# Patient Record
Sex: MALE | Race: BLACK OR AFRICAN AMERICAN | NOT HISPANIC OR LATINO | Employment: FULL TIME | URBAN - METROPOLITAN AREA
[De-identification: names, ages, dates, MRNs, and addresses within clinical notes are randomized per-mention and may not be internally consistent; named-entity substitution may affect disease eponyms.]

---

## 2017-11-29 ENCOUNTER — OFFICE VISIT - HEALTHEAST (OUTPATIENT)
Dept: INTERNAL MEDICINE | Facility: CLINIC | Age: 47
End: 2017-11-29

## 2017-11-29 DIAGNOSIS — B19.10 HEPATITIS B: ICD-10-CM

## 2017-11-29 DIAGNOSIS — Z00.00 HEALTH CARE MAINTENANCE: ICD-10-CM

## 2017-11-29 LAB — HIV 1+2 AB+HIV1 P24 AG SERPL QL IA: NEGATIVE

## 2017-11-29 ASSESSMENT — MIFFLIN-ST. JEOR: SCORE: 1909.06

## 2017-11-30 LAB
HBV SURFACE AG SERPL QL IA: NEGATIVE
HCV AB SERPL QL IA: NEGATIVE
HEPATITIS B SURFACE ANTIBODY LHE- HISTORICAL: POSITIVE

## 2017-12-01 LAB
HBV CORE AB SERPL QL IA: POSITIVE
HBV CORE IGM SERPL QL IA: NEGATIVE

## 2018-01-02 ENCOUNTER — OFFICE VISIT - HEALTHEAST (OUTPATIENT)
Dept: FAMILY MEDICINE | Facility: CLINIC | Age: 48
End: 2018-01-02

## 2018-01-02 DIAGNOSIS — J06.9 VIRAL UPPER RESPIRATORY ILLNESS: ICD-10-CM

## 2018-01-02 DIAGNOSIS — R42 DIZZINESS: ICD-10-CM

## 2020-01-14 ENCOUNTER — OFFICE VISIT - HEALTHEAST (OUTPATIENT)
Dept: FAMILY MEDICINE | Facility: CLINIC | Age: 50
End: 2020-01-14

## 2020-01-14 DIAGNOSIS — R22.32 MASS OF FINGER, LEFT: ICD-10-CM

## 2020-01-21 ENCOUNTER — COMMUNICATION - HEALTHEAST (OUTPATIENT)
Dept: FAMILY MEDICINE | Facility: CLINIC | Age: 50
End: 2020-01-21

## 2020-01-23 ENCOUNTER — RECORDS - HEALTHEAST (OUTPATIENT)
Dept: ADMINISTRATIVE | Facility: OTHER | Age: 50
End: 2020-01-23

## 2020-01-30 ENCOUNTER — RECORDS - HEALTHEAST (OUTPATIENT)
Dept: ADMINISTRATIVE | Facility: OTHER | Age: 50
End: 2020-01-30

## 2020-02-03 ENCOUNTER — RECORDS - HEALTHEAST (OUTPATIENT)
Dept: ADMINISTRATIVE | Facility: OTHER | Age: 50
End: 2020-02-03

## 2020-02-05 ENCOUNTER — RECORDS - HEALTHEAST (OUTPATIENT)
Dept: ADMINISTRATIVE | Facility: OTHER | Age: 50
End: 2020-02-05

## 2020-02-05 LAB
LAB AP CHARGES (HE HISTORICAL CONVERSION): NORMAL
PATH REPORT.COMMENTS IMP SPEC: NORMAL
PATH REPORT.FINAL DX SPEC: NORMAL
PATH REPORT.GROSS SPEC: NORMAL
PATH REPORT.MICROSCOPIC SPEC OTHER STN: NORMAL
PATH REPORT.RELEVANT HX SPEC: NORMAL
RESULT FLAG (HE HISTORICAL CONVERSION): NORMAL

## 2020-02-13 ENCOUNTER — RECORDS - HEALTHEAST (OUTPATIENT)
Dept: ADMINISTRATIVE | Facility: OTHER | Age: 50
End: 2020-02-13

## 2021-05-31 VITALS — BODY MASS INDEX: 30.34 KG/M2 | HEIGHT: 72 IN | WEIGHT: 224 LBS

## 2021-05-31 VITALS — BODY MASS INDEX: 30.87 KG/M2 | WEIGHT: 227.6 LBS

## 2021-06-04 VITALS
RESPIRATION RATE: 16 BRPM | BODY MASS INDEX: 30.57 KG/M2 | HEART RATE: 65 BPM | OXYGEN SATURATION: 98 % | TEMPERATURE: 98.1 F | SYSTOLIC BLOOD PRESSURE: 116 MMHG | DIASTOLIC BLOOD PRESSURE: 74 MMHG | WEIGHT: 225.4 LBS

## 2021-06-05 NOTE — PROGRESS NOTES
Chief Complaint   Patient presents with     left hand 5th digit deformity - surgery in same area 3 yrs       HPI:  Kolton Oliva is a 50 y.o. male who complains of moderate growth on L 5th finger which has been gradually getting larger over the past 2 years. Pt states he had surgery to have a similar growth removed about 4 years ago from the same finger in [place], [place]. He does not remember what the growth was. Symptoms are constant in duration. No treatments tried. Denies pain, warmth, or erythema.    Problem List:  There are no relevant problems documented for this patient.      No past medical history on file.    Social History     Tobacco Use     Smoking status: Never Smoker     Smokeless tobacco: Never Used     Tobacco comment: no vaping   Substance Use Topics     Alcohol use: Not on file       Review of Systems   Constitutional: Negative for chills and fever.   Respiratory: Negative for shortness of breath.    Cardiovascular: Negative for chest pain.   Gastrointestinal: Negative for abdominal pain, diarrhea, nausea and vomiting.   Musculoskeletal: Negative for arthralgias.        Growth on finger   Skin: Negative for rash.   All other systems reviewed and are negative.      Vitals:    01/14/20 1646   BP: 116/74   Patient Site: Right Arm   Patient Position: Sitting   Cuff Size: Adult Regular   Pulse: 65   Resp: 16   Temp: 98.1  F (36.7  C)   TempSrc: Oral   SpO2: 98%   Weight: (!) 225 lb 6.4 oz (102.2 kg)       Physical Exam   Constitutional: He appears well-developed and well-nourished.  Non-toxic appearance.   HENT:   Head: Normocephalic and atraumatic.   Cardiovascular: Normal rate, regular rhythm and normal heart sounds.   Pulmonary/Chest: Effort normal and breath sounds normal.   Musculoskeletal:      Left hand: He exhibits deformity (irregularly shaped cyst/mass to L 5th finger near PIP joint. nontender. no warmth/erythema).   Neurological: He is alert.   Skin: Skin is warm and dry.   Psychiatric: He has a  normal mood and affect.       Labs:  No results found for this or any previous visit (from the past 72 hour(s)).    Radiology:  Xr Finger Left 2 Or More Vws    Result Date: 1/14/2020  EXAM: XR FINGER LEFT 2 OR MORE VWS LOCATION: [facility] DATE/TIME: 1/14/2020 5:14 PM INDICATION: growth on finger for 2 years; had growth removed 4 years ago on same finger (unsure what it was) COMPARISON: None.     Nodular soft tissue swelling/mass along the dorsum of the left fifth finger overlying the proximal phalanx. This could be further characterized with ultrasound or MRI. The bones are intact. No evidence for fracture.      Clinical Decision Making:    No bony involvement on Xray. No evidence of infectious process. Soft tissue mass on L 5th digit, discussed with pt he may need surgery again to remove it. Referral for ortho f/u placed.    At the end of the encounter, I discussed results, diagnosis, medications. Discussed red flags for immediate return to clinic/ER, as well as indications for follow up if no improvement. Patient understood and agreed to plan. Patient was stable for discharge.    1. Mass of finger, left  XR Finger Left 2 or More VWS    Ambulatory referral to Orthopedics         Return in about 1 week (around 1/21/2020) for follow up with orthopedics.    ANDRES Lopes, PATriC  [facility]

## 2021-06-05 NOTE — TELEPHONE ENCOUNTER
Thee Hi assisted him in scheduling with [facility].  Appointment 1/23/2020 @ 2pm with Dr Amanda Ruby

## 2021-06-05 NOTE — TELEPHONE ENCOUNTER
Question following Office Visit  When did you see your provider: Seen at the [facility] in Clinic on 1/14/20  What is your question: Patient was referred to [facility] but when he called to make an appointment he was told they do not have a referral.  Could the Speciality  please follow up on this?  Okay to leave a detailed message: Yes

## 2021-06-14 NOTE — PROGRESS NOTES
"[facility] Note  Patient Name: Kolton Oliva  Patient Age: 47 y.o.  YOB: 1970  MRN: [id number]  ?  Date of Visit: 11/29/2017  Reason for Office Visit:   Chief Complaint   Patient presents with     Lab Result Follow Up     was told had hep b in blood at the [facility]     HPI: Kolton Oliva 47 y.o. male who presents to clinic for hep B testing. He is originally from [facility]. He went to the [facility] to donate blood and tested positive for hep B. No history of liver disease, etoh use, HIV. He had a physical 2 years ago at his previous clinic in [facility] but do not have records, per patient \"everything was normal\" He has no known exposure or positive tests to hepatitis or HIV. Does not use IV drugs. No weight loss, night sweats, dark urine, fatigue, or other constitutional s/s.. Does not take any medications. No significant family history.     Review of Systems: As noted in HPI     Current Scheduled Meds:  No outpatient encounter prescriptions on file as of 11/29/2017.     No facility-administered encounter medications on file as of 11/29/2017.        Objective / Physical Examination:  /82  Pulse (!) 57  Ht 6' (1.829 m)  Wt (!) 224 lb (101.6 kg)  SpO2 98%  BMI 30.38 kg/m2  Wt Readings from Last 3 Encounters:   11/29/17 (!) 224 lb (101.6 kg)     Body mass index is 30.38 kg/(m^2). (>25?)    General Appearance: 48 yo [place] male. Alert and oriented in no acute distress  Lungs: Normal inspiratory and expiratory effort  Cardiovascular: RRR  Abdomen: Bowel sounds active all four quadrants. Soft, non-tender. No hepatomegaly   Integumentary: Warm and dry.   Neuro: Alert and oriented, follows commands appropriatel    Assessment / Plan / Medical Decision Making:      Encounter Diagnoses   Name Primary?     Health care maintenance Yes     Hepatitis B         1. Health care maintenance  2. Hepatitis B    Will obtain serologic markers to test for acute v chronic HBV, as " well check liver panel, HIV, hep C and CBC    - Hepatitis B Surface Antigen (HBsAG)  - Hepatitis B Surface Antibody (Anti-HBs)  - Hepatitis B Core Antibody (Anti-HBc)  - Hepatitis B Core Antibody, IgM (Anti-HBc, IgM)  - Hepatitis C Antibody (Anti-HCV)  - HIV Antigen/Antibody Screening Cascade  - Hepatic Profile  - HM2(CBC w/o Differential)    He prefers to go to  clinic for his primary care. Advised to make a follow up establish care appointment in the next year. I will call and discuss labs and any further evaluation if warranted     Christopher Gil MD  [facility]

## 2021-06-15 NOTE — PROGRESS NOTES
Assessment/Plan:     Patient presents with signs and symptoms consistent with viral respiratory tract infection.  The concerning finding of dizziness I suspect to be secondary to dehydration, and was described as more of a lightheadedness and weakness, versus true dizziness.  Handout was given on dizziness.  His daughter was tested for influenza and found to be negative, which has not to test the father as the likelihood is low and his symptom duration negated the possibility of treatment with Tamiflu.  Recommended symptomatically with Mucinex, Robitussin, and symptomatic care for sore throat.  Encouraged patient to monitor for worsening symptoms, although he is apparently already improving.    Problem List Items Addressed This Visit     None      Visit Diagnoses     Dizziness    -  Primary    Viral upper respiratory illness              Return to clinic PRN.    Total time spent with patient was 10 minutes with greater than 50% spent in face-to-face counseling regarding the above plan.    Subjective:      Kolton Oliva is a 48 y.o. male who presents to clinic for feeling ill.    Patient endorses 5 days of symptoms, although today he notes that he is slightly improving.  He complains of headaches, rhinorrhea, congestion, dizziness, sore throat, a cough productive of yellow sputum with tinges of blood occasionally in the morning, and chills (though he has not taken his temperature).  He also endorses night sweats ×2 and felt that he might of had a fever at that time.  He is nauseated in the morning but has not vomited.  His sick contacts include a daughter, who is only been sick for 3 days.  She has vomiting.    Past Medical History, Family History, and Social History reviewed.     No current outpatient prescriptions on file prior to visit.     No current facility-administered medications on file prior to visit.        Review of systems is as stated in HPI.  The remainder of the 10 system review is otherwise  negative.    Objective:     /79  Pulse 75  Temp 98.9  F (37.2  C) (Oral)   Wt (!) 227 lb 9.6 oz (103.2 kg)  SpO2 97%  BMI 30.87 kg/m2 Body mass index is 30.87 kg/(m^2).    Gen: Alert, NAD, appears stated age, normal hygiene   Eyes: conjunctivae without injection, sclera clear, EOMI  ENT/mouth: nares clear, septum midline, absent rhinorrhea, throat with mild injection, neck is supple, no thyroid enlargement  CV: RRR, no murmur appreciated, pedal edema absent bilaterally  Resp: CTAB, no wheezes, rales or ronchi  ABD: non-tender to palpation, nondistended, normoactive  MSK: grossly full range of motion in all joints, no obvious deformity  Neuro: CN II-XII grossly intact, no deficits in coordination  Psych: no apparent hallucinations or delusions, no pressured speech; alert, oriented x3  SKIN: dry and without lesions  Heme/lymph: no pallor, no active bleeding/bruising, no adenopathy appreciated    This note has been dictated using voice recognition software. Any grammatical or context distortions are unintentional and inherent to the the software.     Dulce Dee MD  [facility]

## 2021-07-09 ENCOUNTER — COMMUNICATION - HEALTHEAST (OUTPATIENT)
Dept: SCHEDULING | Facility: CLINIC | Age: 51
End: 2021-07-09
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
## 2021-08-15 ENCOUNTER — HEALTH MAINTENANCE LETTER (OUTPATIENT)
Age: 51
End: 2021-08-15

## 2021-10-11 ENCOUNTER — HEALTH MAINTENANCE LETTER (OUTPATIENT)
Age: 51
End: 2021-10-11

## 2022-09-25 ENCOUNTER — HEALTH MAINTENANCE LETTER (OUTPATIENT)
Age: 52
End: 2022-09-25

## 2023-05-17 ENCOUNTER — OFFICE VISIT (OUTPATIENT)
Dept: FAMILY MEDICINE | Facility: CLINIC | Age: 53
End: 2023-05-17
Payer: COMMERCIAL

## 2023-05-17 VITALS
WEIGHT: 226 LBS | BODY MASS INDEX: 30.61 KG/M2 | OXYGEN SATURATION: 98 % | TEMPERATURE: 98.7 F | RESPIRATION RATE: 14 BRPM | SYSTOLIC BLOOD PRESSURE: 117 MMHG | DIASTOLIC BLOOD PRESSURE: 72 MMHG | HEART RATE: 67 BPM | HEIGHT: 72 IN

## 2023-05-17 DIAGNOSIS — R76.8 HEPATITIS B ANTIBODY POSITIVE IN BLOOD: ICD-10-CM

## 2023-05-17 DIAGNOSIS — Z71.85 VACCINE COUNSELING: ICD-10-CM

## 2023-05-17 DIAGNOSIS — Z13.220 SCREENING FOR HYPERLIPIDEMIA: ICD-10-CM

## 2023-05-17 DIAGNOSIS — M26.609 TMJ (TEMPOROMANDIBULAR JOINT SYNDROME): Primary | ICD-10-CM

## 2023-05-17 DIAGNOSIS — K21.9 GASTROESOPHAGEAL REFLUX DISEASE WITHOUT ESOPHAGITIS: ICD-10-CM

## 2023-05-17 DIAGNOSIS — Z12.11 SCREEN FOR COLON CANCER: ICD-10-CM

## 2023-05-17 DIAGNOSIS — H93.12 TINNITUS, LEFT: ICD-10-CM

## 2023-05-17 PROCEDURE — 99214 OFFICE O/P EST MOD 30 MIN: CPT | Mod: 25 | Performed by: NURSE PRACTITIONER

## 2023-05-17 PROCEDURE — 90750 HZV VACC RECOMBINANT IM: CPT | Performed by: NURSE PRACTITIONER

## 2023-05-17 PROCEDURE — 90471 IMMUNIZATION ADMIN: CPT | Performed by: NURSE PRACTITIONER

## 2023-05-17 NOTE — PROGRESS NOTES
Assessment & Plan     TMJ (temporomandibular joint syndrome)  Patient with chronic tinnitus on left ear for multiple year presents today with jaw clicking and history of frequent gum chewing, sometimes pain radiates to ear/jaw, worse with stress.  Discussed likely dx of TMJ - hand out given today discussion home care, if no improvement with diet change and jaw rest can see dentist.     Tinnitus, left  See above - follow up if worsens or does not resolve  - Basic metabolic panel  (Ca, Cl, CO2, Creat, Gluc, K, Na, BUN)    Gastroesophageal reflux disease without esophagitis  Stable without medication    Screen for colon cancer  Discussed cologaurd vs colonoscopy - pt opt for colonoscopy   - Colonoscopy Screening  Referral    Screening for hyperlipidemia  Will return when fasting   - Lipid panel reflex to direct LDL Non-fasting    Need for vaccine counseling   Will do titre for Hep B    Patient agrees to shingles vaccine -questions answered     BMI:    Estimated body mass index is 30.65 kg/m  as calculated from the following:    Height as of this encounter: 1.829 m (6').    Weight as of this encounter: 102.5 kg (226 lb).   Weight management plan: Discussed healthy diet and exercise guidelines        Spent 30mins doing chart review, history and exam, patient education, documentation and further activities per the note.      GISELA Cronin CNP  [facility]    Saqib Hi is a 53 year old, presenting for the following health issues:  Establish Care (New patient, referral for audiologist )    Left tinnitus for many years - chews a lot of gum through out the day, denies facial trauma, hearing change he hears this worse at night when it is quit    Not on any medication    Hx of GERD - no longer taking medication        History of Present Illness       Reason for visit:  [facility] care visit    He eats 4 or more servings of fruits and vegetables daily.He consumes 0 sweetened  beverage(s) daily.He exercises with enough effort to increase his heart rate 60 or more minutes per day.  He exercises with enough effort to increase his heart rate 4 days per week.   He is taking medications regularly.     Do construction surveying - walking a lot             Review of Systems   Constitutional, HEENT, cardiovascular, pulmonary, gi and gu systems are negative, except as otherwise noted.      Objective    /72   Pulse 67   Temp 98.7  F (37.1  C) (Oral)   Resp 14   Ht 1.829 m (6')   Wt 102.5 kg (226 lb)   SpO2 98%   BMI 30.65 kg/m    Body mass index is 30.65 kg/m .  Physical Exam   GENERAL: healthy, alert and no distress  HENT: ear canals and TM's normal, nose and mouth without ulcers or lesions, patient with click on Right then left jaw with opening of the mouth, pain on palpation of left TMJ  NECK: no adenopathy, no asymmetry, masses, or scars and thyroid normal to palpation  RESP: lungs clear to auscultation - no rales, rhonchi or wheezes  CV: regular rate and rhythm, normal S1 S2, no S3 or S4, no murmur, click or rub, no peripheral edema and peripheral pulses strong  ABDOMEN: soft, nontender, no hepatosplenomegaly, no masses and bowel sounds normal  MS: no gross musculoskeletal defects noted, no edema

## 2023-05-23 ENCOUNTER — LAB (OUTPATIENT)
Dept: LAB | Facility: CLINIC | Age: 53
End: 2023-05-23
Payer: COMMERCIAL

## 2023-05-23 DIAGNOSIS — Z71.85 VACCINE COUNSELING: ICD-10-CM

## 2023-05-23 DIAGNOSIS — H93.12 TINNITUS, LEFT: ICD-10-CM

## 2023-05-23 DIAGNOSIS — Z13.220 SCREENING FOR HYPERLIPIDEMIA: ICD-10-CM

## 2023-05-23 LAB
ANION GAP SERPL CALCULATED.3IONS-SCNC: 11 MMOL/L (ref 7–15)
BUN SERPL-MCNC: 22.2 MG/DL (ref 6–20)
CALCIUM SERPL-MCNC: 9.5 MG/DL (ref 8.6–10)
CHLORIDE SERPL-SCNC: 102 MMOL/L (ref 98–107)
CHOLEST SERPL-MCNC: 167 MG/DL
CREAT SERPL-MCNC: 1.14 MG/DL (ref 0.67–1.17)
DEPRECATED HCO3 PLAS-SCNC: 25 MMOL/L (ref 22–29)
GFR SERPL CREATININE-BSD FRML MDRD: 77 ML/MIN/1.73M2
GLUCOSE SERPL-MCNC: 100 MG/DL (ref 70–99)
HBV SURFACE AB SERPL IA-ACNC: >1000 M[IU]/ML
HBV SURFACE AB SERPL IA-ACNC: REACTIVE M[IU]/ML
HDLC SERPL-MCNC: 48 MG/DL
LDLC SERPL CALC-MCNC: 103 MG/DL
NONHDLC SERPL-MCNC: 119 MG/DL
POTASSIUM SERPL-SCNC: 4.3 MMOL/L (ref 3.4–5.3)
SODIUM SERPL-SCNC: 138 MMOL/L (ref 136–145)
TRIGL SERPL-MCNC: 82 MG/DL

## 2023-05-23 PROCEDURE — 36415 COLL VENOUS BLD VENIPUNCTURE: CPT

## 2023-05-23 PROCEDURE — 80061 LIPID PANEL: CPT

## 2023-05-23 PROCEDURE — 80048 BASIC METABOLIC PNL TOTAL CA: CPT

## 2023-05-23 PROCEDURE — 86706 HEP B SURFACE ANTIBODY: CPT

## 2023-05-24 PROBLEM — R76.8 HEPATITIS B ANTIBODY POSITIVE IN BLOOD: Status: ACTIVE | Noted: 2023-05-24

## 2023-07-12 ENCOUNTER — TELEPHONE (OUTPATIENT)
Dept: FAMILY MEDICINE | Facility: CLINIC | Age: 53
End: 2023-07-12
Payer: COMMERCIAL

## 2023-07-12 DIAGNOSIS — H93.12 TINNITUS, LEFT: Primary | ICD-10-CM

## 2023-07-12 NOTE — TELEPHONE ENCOUNTER
Order/Referral Request    Who is requesting: Patient    Orders being requested: ENT   Tinnitus    Reason service is needed/diagnosis: ears ringing    When are orders needed by: soon    Has this been discussed with Provider: Yes  5/17/2023   Declined morelia Perry  Does patient have a preference on a Group/Provider/Facility? Anywhere in [place]    Does patient have an appointment scheduled?: No    Where to send orders: Place orders within Epic    Could we send this information to you in [facility] or would you prefer to receive a phone call?:   Patient would prefer a phone call   Okay to leave a detailed message?: Yes at Cell number on file:    Telephone Information:   Mobile [phone]     Sariah Izquierdo on 7/12/2023 at 1:53 PM

## 2023-07-12 NOTE — TELEPHONE ENCOUNTER
S-(situation): Referral for ENT requested for tinnitus.     B-(background): LOV 5/17/2023 with PCP    BMP results largely WNL    A-(assessment): Need to clarify if same as at LOV or if worsening. Call to patient at [phone] connected on second attempt.      Reports that now tinnitus is every day and worse at night. Jaw is still clicking. Denies other symptoms.    R-(recommendations): Pended ENT referral for ear symptoms, routing to PCP to review.

## 2023-07-14 NOTE — TELEPHONE ENCOUNTER
Referral placed to ENT - pt should get a call from referral coordinator    Thank you   GISELA Cronin CNP on 7/14/2023 at 5:26 PM

## 2023-07-19 NOTE — TELEPHONE ENCOUNTER
Patient has not heard from ENT scheduling as of yet, I gave him the number to call and schedule an appointment.

## 2023-10-04 ENCOUNTER — TRANSFERRED RECORDS (OUTPATIENT)
Dept: HEALTH INFORMATION MANAGEMENT | Facility: CLINIC | Age: 53
End: 2023-10-04
Payer: COMMERCIAL

## 2023-10-14 ENCOUNTER — HEALTH MAINTENANCE LETTER (OUTPATIENT)
Age: 53
End: 2023-10-14

## 2023-10-30 NOTE — PROGRESS NOTES
History of Present Illness - Kolton Oliva is a very pleasant 53 year old male here to see me for the first time due to tinnitus.    This started about 5-6 months ago, at first only at night when it was quiet.  But over time it seems to be more noticeable, and it does come and go.  It is still most notable when he is asleep.  He does not think that this has effected hearing or communications.    Before this started, there was no change in any medications or new health issues.    Otherwise no history of chronic ear disease.  No previous ear surgery.  No history of chemo or radiation therapy to the head and neck, and no major head trauma.  He denies a history of  service, no frequent firearm use.  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      Past Medical History -   Patient Active Problem List   Diagnosis    Gastroesophageal reflux disease without esophagitis    Tinnitus, left    Hepatitis B antibody positive in blood       Current Medications - No current outpatient medications on file.    Allergies - No Known Allergies    Social History -   Social History     Socioeconomic History    Marital status:    Tobacco Use    Smoking status: Never    Smokeless tobacco: Never    Tobacco comments:     no vaping       Family History -   Family History   Problem Relation Age of Onset    No Known Problems Sister     No Known Problems Brother     Cancer No family hx of        Review of Systems - As per HPI and PMHx, otherwise 10+ system review of the head and neck, and general constitution is negative.    Physical Exam  /76   Pulse 67   Wt 102.1 kg (225 lb)   SpO2 98%   BMI 30.52 kg/m        General - The patient is well nourished and well developed, and appears to have good nutritional status.  Alert and oriented to person and place, answers questions and cooperates with examination appropriately.   Head and Face - Normocephalic and atraumatic, with no gross asymmetry noted of the contour  of the facial features.  The facial nerve is intact, with strong symmetric movements.  Voice and Breathing - The patient was breathing comfortably without the use of accessory muscles. There was no wheezing, stridor, or stertor.  The patients voice was clear and strong, and had appropriate pitch and quality.  Ears - The tympanic membranes are normal in appearance, bony landmarks are intact.  No retraction, perforation, or masses.  No fluid or purulence was seen in the external canal or the middle ear. No evidence of infection of the middle ear or external canal, cerumen was normal in appearance.  Eyes - Extraocular movements intact, and the pupils were reactive to light.  Sclera were not icteric or injected, conjunctiva were pink and moist.  Mouth - Examination of the oral cavity showed pink, healthy oral mucosa. No lesions or ulcerations noted.  The tongue was mobile and midline, and the dentition were in good condition.    Throat - The walls of the oropharynx were smooth, pink, moist, symmetric, and had no lesions or ulcerations.  The tonsillar pillars and soft palate were symmetric.  The uvula was midline on elevation.    Neck - Normal midline excursion of the laryngotracheal complex during swallowing.  Full range of motion on passive movement.  Palpation of the occipital, submental, submandibular, internal jugular chain, and supraclavicular nodes did not demonstrate any abnormal lymph nodes or masses.  The carotid pulse was palpable bilaterally.  Palpation of the thyroid was soft and smooth, with no nodules or goiter appreciated.  The trachea was mobile and midline.  Nose - External contour is symmetric, no gross deflection or scars.  Nasal mucosa is pink and moist with no abnormal mucus.  The septum was midline and non-obstructive, turbinates of normal size and position.  No polyps, masses, or purulence noted on examination.    Audiologic Studies - An audiogram and tympanogram were performed today as part of  the evaluation and personally reviewed. The tympanogram shows a normal Type A curve, with normal canal volume and middle ear pressure.  There is no sign of eustachian tube dysfunction or middle ear effusion.    The audiogram shows a cookie bite symmetric sensorineural hearing loss loss that centers around 2-3K Hz.  No conductive hearing loss. Normal word recognition.      A/P - Kolton Oliva is a 53 year old male  (H90.3) Sensorineural hearing loss (SNHL) of both ears  (primary encounter diagnosis)  (H93.13) Tinnitus, bilateral    We spent the remainder of today's visit discussing the current leading theory on tinnitus, in that it originates from the central nervous system itself, similar to Phantom Limb Syndrome.  Also discussed were steps that can be taken to mask the noise, such as a low volume de-tuned radio, a fan in the background, and hearing aids.  Correlation with stress, anxiety, depression, and high blood pressure were also discussed.  The patient was also cautioned on the numerous expensive non-pharmaceutical options that are advertised, and have no proven benefit.    The patient will follow up as necessary for worsening symptoms, changes in symptoms, or signs of infection.  I have also recommended yearly audiograms, and consideration of a hearing aid evaluation.

## 2023-11-06 ENCOUNTER — OFFICE VISIT (OUTPATIENT)
Dept: OTOLARYNGOLOGY | Facility: CLINIC | Age: 53
End: 2023-11-06
Attending: NURSE PRACTITIONER
Payer: COMMERCIAL

## 2023-11-06 ENCOUNTER — OFFICE VISIT (OUTPATIENT)
Dept: AUDIOLOGY | Facility: CLINIC | Age: 53
End: 2023-11-06
Attending: NURSE PRACTITIONER
Payer: COMMERCIAL

## 2023-11-06 VITALS
DIASTOLIC BLOOD PRESSURE: 76 MMHG | SYSTOLIC BLOOD PRESSURE: 119 MMHG | HEART RATE: 67 BPM | BODY MASS INDEX: 30.52 KG/M2 | WEIGHT: 225 LBS | OXYGEN SATURATION: 98 %

## 2023-11-06 DIAGNOSIS — H90.3 SENSORINEURAL HEARING LOSS, BILATERAL: Primary | ICD-10-CM

## 2023-11-06 DIAGNOSIS — H93.13 TINNITUS, BILATERAL: ICD-10-CM

## 2023-11-06 DIAGNOSIS — H90.3 SENSORINEURAL HEARING LOSS (SNHL) OF BOTH EARS: Primary | ICD-10-CM

## 2023-11-06 DIAGNOSIS — H93.12 TINNITUS, LEFT: ICD-10-CM

## 2023-11-06 PROCEDURE — 92557 COMPREHENSIVE HEARING TEST: CPT

## 2023-11-06 PROCEDURE — 99203 OFFICE O/P NEW LOW 30 MIN: CPT | Performed by: OTOLARYNGOLOGY

## 2023-11-06 PROCEDURE — 92550 TYMPANOMETRY & REFLEX THRESH: CPT

## 2023-11-06 NOTE — LETTER
11/6/2023         RE: Kolton Oliva  [address] [facility], [place] [address]        Dear Colleague,    Thank you for referring your patient, Kolton Oliva, to the [facility]. Please see a copy of my visit note below.    History of Present Illness - Kolton Oliva is a very pleasant 53 year old male here to see me for the first time due to tinnitus.    This started about 5-6 months ago, at first only at night when it was quiet.  But over time it seems to be more noticeable, and it does come and go.  It is still most notable when he is asleep.  He does not think that this has effected hearing or communications.    Before this started, there was no change in any medications or new health issues.    Otherwise no history of chronic ear disease.  No previous ear surgery.  No history of chemo or radiation therapy to the head and neck, and no major head trauma.  He denies a history of  service, no frequent firearm use.  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      Past Medical History -   Patient Active Problem List   Diagnosis     Gastroesophageal reflux disease without esophagitis     Tinnitus, left     Hepatitis B antibody positive in blood       Current Medications - No current outpatient medications on file.    Allergies - No Known Allergies    Social History -   Social History     Socioeconomic History     Marital status:    Tobacco Use     Smoking status: Never     Smokeless tobacco: Never     Tobacco comments:     no vaping       Family History -   Family History   Problem Relation Age of Onset     No Known Problems Sister      No Known Problems Brother      Cancer No family hx of        Review of Systems - As per HPI and PMHx, otherwise 10+ system review of the head and neck, and general constitution is negative.    Physical Exam  /76   Pulse 67   Wt 102.1 kg (225 lb)   SpO2 98%   BMI 30.52 kg/m        General - The patient is well nourished and well  developed, and appears to have good nutritional status.  Alert and oriented to person and place, answers questions and cooperates with examination appropriately.   Head and Face - Normocephalic and atraumatic, with no gross asymmetry noted of the contour of the facial features.  The facial nerve is intact, with strong symmetric movements.  Voice and Breathing - The patient was breathing comfortably without the use of accessory muscles. There was no wheezing, stridor, or stertor.  The patients voice was clear and strong, and had appropriate pitch and quality.  Ears - The tympanic membranes are normal in appearance, bony landmarks are intact.  No retraction, perforation, or masses.  No fluid or purulence was seen in the external canal or the middle ear. No evidence of infection of the middle ear or external canal, cerumen was normal in appearance.  Eyes - Extraocular movements intact, and the pupils were reactive to light.  Sclera were not icteric or injected, conjunctiva were pink and moist.  Mouth - Examination of the oral cavity showed pink, healthy oral mucosa. No lesions or ulcerations noted.  The tongue was mobile and midline, and the dentition were in good condition.    Throat - The walls of the oropharynx were smooth, pink, moist, symmetric, and had no lesions or ulcerations.  The tonsillar pillars and soft palate were symmetric.  The uvula was midline on elevation.    Neck - Normal midline excursion of the laryngotracheal complex during swallowing.  Full range of motion on passive movement.  Palpation of the occipital, submental, submandibular, internal jugular chain, and supraclavicular nodes did not demonstrate any abnormal lymph nodes or masses.  The carotid pulse was palpable bilaterally.  Palpation of the thyroid was soft and smooth, with no nodules or goiter appreciated.  The trachea was mobile and midline.  Nose - External contour is symmetric, no gross deflection or scars.  Nasal mucosa is pink and  moist with no abnormal mucus.  The septum was midline and non-obstructive, turbinates of normal size and position.  No polyps, masses, or purulence noted on examination.    Audiologic Studies - An audiogram and tympanogram were performed today as part of the evaluation and personally reviewed. The tympanogram shows a normal Type A curve, with normal canal volume and middle ear pressure.  There is no sign of eustachian tube dysfunction or middle ear effusion.    The audiogram shows a cookie bite symmetric sensorineural hearing loss loss that centers around 2-3K Hz.  No conductive hearing loss. Normal word recognition.      A/P - Kolton Oliva is a 53 year old male  (H90.3) Sensorineural hearing loss (SNHL) of both ears  (primary encounter diagnosis)  (H93.13) Tinnitus, bilateral    We spent the remainder of today's visit discussing the current leading theory on tinnitus, in that it originates from the central nervous system itself, similar to Phantom Limb Syndrome.  Also discussed were steps that can be taken to mask the noise, such as a low volume de-tuned radio, a fan in the background, and hearing aids.  Correlation with stress, anxiety, depression, and high blood pressure were also discussed.  The patient was also cautioned on the numerous expensive non-pharmaceutical options that are advertised, and have no proven benefit.    The patient will follow up as necessary for worsening symptoms, changes in symptoms, or signs of infection.  I have also recommended yearly audiograms, and consideration of a hearing aid evaluation.        Again, thank you for allowing me to participate in the care of your patient.        Sincerely,        Greg Grimaldo MD

## 2023-11-06 NOTE — PROGRESS NOTES
AUDIOLOGY REPORT:    Patient was referred to [facility] from ENT by Dr. Grimaldo for a hearing examination. Patient is here today with concerns regarding left-sided tinnitus that began around 5 months ago. He states the tinnitus mostly occurs at night. Kolton denies pain, pressure, drainage, history of ear surgeries, dizziness, family history of hearing loss. 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 Kolton was not accompanied to today's appointment    Testing:    Otoscopy:   Otoscopic exam indicates ears are clear of cerumen bilaterally     Tympanograms:    RIGHT: normal eardrum mobility     LEFT:   normal eardrum mobility    Reflexes (reported by stimulus ear): 1000 Hz  RIGHT: Ipsilateral is present at normal levels  RIGHT: Contralateral is absent at frequencies tested  LEFT:   Ipsilateral is present at normal levels  LEFT:   Contralateral is absent at frequencies tested    Thresholds:   Pure Tone Thresholds assessed using conventional audiometry with good  reliability from 250-8000 Hz bilaterally using insert earphones and circumaural headphones     RIGHT:  normal sloping to a mild sensorineural hearing loss through 3000 Hz, rising to normal     LEFT:    normal sloping to a mild/moderate sensorineural hearing loss through 3000 Hz rising to normal     Speech Reception Threshold:    RIGHT: 25 dB HL    LEFT:   25 dB HL  Speech Reception Thresholds are in good agreement with pure tone thresholds    Word Recognition Score:     RIGHT: 100% at 65 dB HL using NU-6 recorded word list.    LEFT:   96% at 65 dB HL using NU-6 recorded word list.    Discussed results with the patient.     Patient was returned to ENT for follow up.     Sheila White, CCC-A  Licensed Audiologist  [place] #[id number]    11/06/23

## 2024-04-04 ENCOUNTER — OFFICE VISIT (OUTPATIENT)
Dept: FAMILY MEDICINE | Facility: CLINIC | Age: 54
End: 2024-04-04
Payer: COMMERCIAL

## 2024-04-04 VITALS
OXYGEN SATURATION: 98 % | RESPIRATION RATE: 14 BRPM | HEART RATE: 64 BPM | TEMPERATURE: 98.6 F | SYSTOLIC BLOOD PRESSURE: 117 MMHG | DIASTOLIC BLOOD PRESSURE: 84 MMHG

## 2024-04-04 DIAGNOSIS — J06.9 VIRAL URI: Primary | ICD-10-CM

## 2024-04-04 PROCEDURE — 99213 OFFICE O/P EST LOW 20 MIN: CPT | Performed by: FAMILY MEDICINE

## 2024-04-05 NOTE — PROGRESS NOTES
Assessment:       Viral URI      Plan:     Symptoms consistent with a viral upper respiratory infection.  Discussed the typical course of symptoms.  Noantibiotics indicated at this time.  Recommend symptomatic treatment such as decongestants and acetominephen or ibuprofen as needed.  Recommend follow up if getting worse or not improving.          Subjective:       54 year old male presents for evaluation of 3-day history of nasal congestion and cough.  No shortness of breath or wheezing.  No fevers.  He has had a mild sore throat but not significantly so.  No difficulty swallowing.  No ear pain.    Patient Active Problem List   Diagnosis    Gastroesophageal reflux disease without esophagitis    Tinnitus, left    Hepatitis B antibody positive in blood       No past medical history on file.    Past Surgical History:   Procedure Laterality Date    HIP SURGERY Left     2002 - [facility]       No current outpatient medications on file.     No current facility-administered medications for this visit.       No Known Allergies    Family History   Problem Relation Age of Onset    No Known Problems Sister     No Known Problems Brother     Cancer No family hx of        Social History     Socioeconomic History    Marital status:      Spouse name: None    Number of children: None    Years of education: None    Highest education level: None   Tobacco Use    Smoking status: Never    Smokeless tobacco: Never    Tobacco comments:     no vaping   Substance and Sexual Activity    Alcohol use: Yes     Comment: Very rarely    Drug use: Never    Sexual activity: Yes     Partners: Female         Review of Systems  Pertinent items are noted in HPI.      Objective:                 General Appearance:    /84   Pulse 64   Temp 98.6  F (37  C) (Oral)   Resp 14   SpO2 98%         Alert, pleasant, cooperative, no distress, appears stated age   Head:    Normocephalic, without obvious abnormality, atraumatic   Eyes:     Conjunctiva/corneas clear   Ears:    Normal TM's without erythema or bulging. Normal external ear canals, both ears   Nose:   Nares normal, septum midline, mucosa normal, no drainage    or sinus tenderness   Throat:   Lips, mucosa, and tongue normal; teeth and gums normal.  No tonsilar hypertrophy or exudate.   Neck:   Supple, symmetrical, trachea midline, no adenopathy    Lungs:     Clear to auscultation bilaterally without wheezes, rales, or rhonchi, respirations unlabored    Heart:    Regular rate and rhythm, S1 and S2 normal, no murmur, rub or gallop       Extremities:   Extremities normal, atraumatic, no cyanosis or edema   Skin:   Skin color, texture, turgor normal, no rashes or lesions         This note has been dictated using voice recognition software. Any grammatical or context distortions are unintentional and inherent to the software

## 2024-06-03 ENCOUNTER — OFFICE VISIT (OUTPATIENT)
Dept: FAMILY MEDICINE | Facility: CLINIC | Age: 54
End: 2024-06-03
Payer: COMMERCIAL

## 2024-06-03 VITALS
WEIGHT: 227 LBS | SYSTOLIC BLOOD PRESSURE: 114 MMHG | HEART RATE: 58 BPM | BODY MASS INDEX: 30.79 KG/M2 | DIASTOLIC BLOOD PRESSURE: 72 MMHG | RESPIRATION RATE: 16 BRPM | TEMPERATURE: 98.2 F | OXYGEN SATURATION: 97 %

## 2024-06-03 DIAGNOSIS — R05.1 ACUTE COUGH: Primary | ICD-10-CM

## 2024-06-03 LAB
DEPRECATED S PYO AG THROAT QL EIA: NEGATIVE
GROUP A STREP BY PCR: NOT DETECTED

## 2024-06-03 PROCEDURE — 86481 TB AG RESPONSE T-CELL SUSP: CPT | Performed by: PHYSICIAN ASSISTANT

## 2024-06-03 PROCEDURE — 87651 STREP A DNA AMP PROBE: CPT | Performed by: PHYSICIAN ASSISTANT

## 2024-06-03 PROCEDURE — 36415 COLL VENOUS BLD VENIPUNCTURE: CPT | Performed by: PHYSICIAN ASSISTANT

## 2024-06-03 PROCEDURE — 99213 OFFICE O/P EST LOW 20 MIN: CPT | Performed by: PHYSICIAN ASSISTANT

## 2024-06-03 NOTE — PROGRESS NOTES
Patient presents with:  Cough: Has had cough for 2 weeks had fever for short time      Clinical Decision Making:  Sick x 2 weeks. No clinical reason for testing for COVID or influenza, so I did talk patient out of testing for these things. RST is negative. Lungs CTA. He is concerned for the possibility of TB due to seeing something about positive cases on the news. Quantiferon qold testing was done today, but low suspicion based on symptoms. Suspect viral URI and recommend supportive cares.       ICD-10-CM    1. Acute cough  R05.1 Streptococcus A Rapid Screen w/Reflex to PCR     Quantiferon TB Gold Plus     Group A Streptococcus PCR Throat Swab     Quantiferon TB Gold Plus          Patient Instructions   1) Increase fluids and rest  2) You will be notified of TB screening test once it's back.  3) Continue taking Tylenol/Ibuprofen for pain relief as needed.  4) Salt water gargles and lozenges can be helpful for throat relief  5) You will only be notified of the confirmatory strep results if they are positive.       HPI:  Kolton Oliva is a 54 year old male who presents today with concerns of cough x 2 weeks. Patient reports lots of mucous with the cough. He had fever and ST at the beginning of the illness, but those have not reoccured. His daughter has had similar symptoms for the same amount of time. He now has been having headache. No stomach upset. His coworker has been coughing for a long time, and he believes that he got this from him. He read something about TB cases on the news and is interested in testing for this.     History obtained from the patient.    Problem List:  2023-05: Hepatitis B antibody positive in blood  2023-05: Gastroesophageal reflux disease without esophagitis  2023-05: Tinnitus, left      No past medical history on file.    Social History     Tobacco Use    Smoking status: Never    Smokeless tobacco: Never    Tobacco comments:     no vaping   Substance Use Topics    Alcohol use: Yes      Comment: Very rarely       Review of Systems    Vitals:    06/03/24 1122   BP: 114/72   Pulse: 58   Resp: 16   Temp: 98.2  F (36.8  C)   TempSrc: Oral   SpO2: 97%   Weight: 103 kg (227 lb)       Physical Exam  Vitals and nursing note reviewed.   Constitutional:       General: He is not in acute distress.     Appearance: He is not toxic-appearing or diaphoretic.   HENT:      Head: Normocephalic and atraumatic.      Right Ear: Tympanic membrane, ear canal and external ear normal.      Left Ear: Tympanic membrane, ear canal and external ear normal.      Mouth/Throat:      Mouth: Mucous membranes are moist.      Pharynx: No oropharyngeal exudate or posterior oropharyngeal erythema.   Eyes:      Conjunctiva/sclera: Conjunctivae normal.   Cardiovascular:      Rate and Rhythm: Normal rate and regular rhythm.      Heart sounds: No murmur heard.  Pulmonary:      Effort: Pulmonary effort is normal. No respiratory distress.      Breath sounds: Normal breath sounds.   Lymphadenopathy:      Cervical: No cervical adenopathy.   Neurological:      Mental Status: He is alert.   Psychiatric:         Mood and Affect: Mood normal.         Behavior: Behavior normal.         Thought Content: Thought content normal.         Judgment: Judgment normal.         Results:  Results for orders placed or performed in visit on 06/03/24   Streptococcus A Rapid Screen w/Reflex to PCR     Status: Normal    Specimen: Throat; Swab   Result Value Ref Range    Group A Strep antigen Negative Negative   Quantiferon TB Gold Plus     Status: None (In process)    Specimen: Peripheral Blood    Narrative    The following orders were created for panel order Quantiferon TB Gold Plus.  Procedure                               Abnormality         Status                     ---------                               -----------         ------                     Quantiferon TB Gold Plus...[[id number]]                      In process                 Quantiferon TB Gold  Plus...[[id number]]                      In process                 Quantiferon TB Gold Plus...[[id number]]                      In process                 Quantiferon TB Gold Plus...[[id number]]                      In process                   Please view results for these tests on the individual orders.         At the end of the encounter, I discussed results, diagnosis, medications. Discussed red flags for immediate return to clinic/ER, as well as indications for follow up if no improvement. Patient understood and agreed to plan. Patient was stable for discharge.

## 2024-06-03 NOTE — PATIENT INSTRUCTIONS
1) Increase fluids and rest  2) You will be notified of TB screening test once it's back.  3) Continue taking Tylenol/Ibuprofen for pain relief as needed.  4) Salt water gargles and lozenges can be helpful for throat relief  5) You will only be notified of the confirmatory strep results if they are positive.

## 2024-06-05 LAB
QUANTIFERON MITOGEN: 10 IU/ML
QUANTIFERON NIL TUBE: 0.15 IU/ML
QUANTIFERON TB1 TUBE: 0.38 IU/ML
QUANTIFERON TB2 TUBE: 0.46

## 2024-06-06 LAB
GAMMA INTERFERON BACKGROUND BLD IA-ACNC: 0.15 IU/ML
M TB IFN-G BLD-IMP: NEGATIVE
M TB IFN-G CD4+ BCKGRND COR BLD-ACNC: 9.85 IU/ML
MITOGEN IGNF BCKGRD COR BLD-ACNC: 0.23 IU/ML
MITOGEN IGNF BCKGRD COR BLD-ACNC: 0.31 IU/ML

## 2024-07-01 ENCOUNTER — OFFICE VISIT (OUTPATIENT)
Dept: FAMILY MEDICINE | Facility: CLINIC | Age: 54
End: 2024-07-01
Payer: COMMERCIAL

## 2024-07-01 VITALS
HEART RATE: 70 BPM | SYSTOLIC BLOOD PRESSURE: 112 MMHG | OXYGEN SATURATION: 97 % | RESPIRATION RATE: 16 BRPM | TEMPERATURE: 98.4 F | DIASTOLIC BLOOD PRESSURE: 72 MMHG

## 2024-07-01 DIAGNOSIS — L02.91 ABSCESS: Primary | ICD-10-CM

## 2024-07-01 DIAGNOSIS — L73.1 INGROWN HAIR: ICD-10-CM

## 2024-07-01 PROCEDURE — 87186 SC STD MICRODIL/AGAR DIL: CPT | Mod: 59 | Performed by: PHYSICIAN ASSISTANT

## 2024-07-01 PROCEDURE — 87077 CULTURE AEROBIC IDENTIFY: CPT | Performed by: PHYSICIAN ASSISTANT

## 2024-07-01 PROCEDURE — 10060 I&D ABSCESS SIMPLE/SINGLE: CPT | Performed by: PHYSICIAN ASSISTANT

## 2024-07-01 PROCEDURE — 87070 CULTURE OTHR SPECIMN AEROBIC: CPT | Performed by: PHYSICIAN ASSISTANT

## 2024-07-01 RX ORDER — SULFAMETHOXAZOLE/TRIMETHOPRIM 800-160 MG
1 TABLET ORAL 2 TIMES DAILY
Qty: 10 TABLET | Refills: 0 | Status: SHIPPED | OUTPATIENT
Start: 2024-07-01 | End: 2024-07-06

## 2024-07-01 NOTE — PROGRESS NOTES
Chief Complaint   Patient presents with    Mass     Mass located on upper pelvis. Concerned it may be a tick bite. Noticed about 4 days ago.        ASSESSMENT/PLAN:  Kolton was seen today for mass.    Diagnoses and all orders for this visit:    Abscess  -     sulfamethoxazole-trimethoprim (BACTRIM DS) 800-160 MG tablet; Take 1 tablet by mouth 2 times daily for 5 days    Ingrown hair    After procedure a large tangled mass of hair was removed.  Ingrown hair likely leading to it abscess and underlying induration.  Difficult to tell if there is some cellulitis with the skin tone but it is diffusely tender in the area so we will just treat empirically with Bactrim for a few days    Procedure:   Area was prepped with Betadine and then alcohol swab.  3 cc of 2% lidocaine with epinephrine was used to anesthetize the area.  An 11 blade was used to make a 0.5 cm full-thickness incision over area of purulent discharge, more purulent discharge was expressed and a tangled mass of hair was removed.  The area was explored with mosquito forceps and no other locules or purulence was expressed.  Minimal blood.      Nolberto Gilbert PA-C      SUBJECTIVE:  Kolton is a 54 year old male who presents to urgent care with a mass on his pubic region.  It seems to be growing over the last few days and is tender.  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    ROS: Pertinent ROS neg other than the symptoms noted above in the HPI.     OBJECTIVE:  /72   Pulse 70   Temp 98.4  F (36.9  C) (Oral)   Resp 16   SpO2 97%    GENERAL: alert and no distress  MS: no gross musculoskeletal defects noted, no edema  SKIN: Mass on the suprapubic region, tender, small amount of purulence expressed with palpation  NEURO: Normal strength and tone, mentation intact and speech normal    DIAGNOSTICS    No results found for any visits on 07/01/24.     No current outpatient medications on file.     No current facility-administered medications for  this visit.      Patient Active Problem List   Diagnosis    Gastroesophageal reflux disease without esophagitis    Tinnitus, left    Hepatitis B antibody positive in blood      No past medical history on file.  Past Surgical History:   Procedure Laterality Date    HIP SURGERY Left     2002 [facility]     Family History   Problem Relation Age of Onset    No Known Problems Sister     No Known Problems Brother     Cancer No family hx of      Social History     Tobacco Use    Smoking status: Never    Smokeless tobacco: Never    Tobacco comments:     no vaping   Substance Use Topics    Alcohol use: Yes     Comment: Very rarely              The plan of care was discussed with the patient. They understand and agree with the course of treatment prescribed. A printed summary was given including instructions and medications.  The use of Dragon/Simperium dictation services may have been used to construct the content in this note; any grammatical or spelling errors are non-intentional. Please contact the author of this note directly if you are in need of any clarification.

## 2024-07-04 LAB
BACTERIA ABSC ANAEROBE+AEROBE CULT: ABNORMAL

## 2024-07-05 DIAGNOSIS — L03.90 CELLULITIS, UNSPECIFIED CELLULITIS SITE: Primary | ICD-10-CM

## 2024-07-05 RX ORDER — AMOXICILLIN 875 MG
875 TABLET ORAL 2 TIMES DAILY
Qty: 10 TABLET | Refills: 0 | Status: SHIPPED | OUTPATIENT
Start: 2024-07-05 | End: 2024-07-10

## 2024-12-07 ENCOUNTER — HEALTH MAINTENANCE LETTER (OUTPATIENT)
Age: 54
End: 2024-12-07

## 2025-03-19 ENCOUNTER — OFFICE VISIT (OUTPATIENT)
Dept: FAMILY MEDICINE | Facility: CLINIC | Age: 55
End: 2025-03-19
Payer: COMMERCIAL

## 2025-03-19 VITALS
WEIGHT: 224 LBS | HEART RATE: 61 BPM | BODY MASS INDEX: 31.36 KG/M2 | RESPIRATION RATE: 16 BRPM | TEMPERATURE: 97.6 F | SYSTOLIC BLOOD PRESSURE: 112 MMHG | HEIGHT: 71 IN | OXYGEN SATURATION: 99 % | DIASTOLIC BLOOD PRESSURE: 70 MMHG

## 2025-03-19 DIAGNOSIS — Z12.5 SCREENING FOR PROSTATE CANCER: ICD-10-CM

## 2025-03-19 DIAGNOSIS — Z00.00 ROUTINE GENERAL MEDICAL EXAMINATION AT A HEALTH CARE FACILITY: Primary | ICD-10-CM

## 2025-03-19 DIAGNOSIS — L81.9 MULTIPLE HYPOPIGMENTED SKIN LESIONS ON BOTH FOREARMS: ICD-10-CM

## 2025-03-19 DIAGNOSIS — L81.8 IDIOPATHIC GUTTATE HYPOMELANOSIS: ICD-10-CM

## 2025-03-19 DIAGNOSIS — Z13.220 SCREENING FOR HYPERLIPIDEMIA: ICD-10-CM

## 2025-03-19 DIAGNOSIS — Z13.1 SCREENING FOR DIABETES MELLITUS: ICD-10-CM

## 2025-03-19 LAB
EST. AVERAGE GLUCOSE BLD GHB EST-MCNC: 117 MG/DL
HBA1C MFR BLD: 5.7 % (ref 0–5.6)

## 2025-03-19 PROCEDURE — 99396 PREV VISIT EST AGE 40-64: CPT | Mod: 25 | Performed by: NURSE PRACTITIONER

## 2025-03-19 PROCEDURE — 3074F SYST BP LT 130 MM HG: CPT | Performed by: NURSE PRACTITIONER

## 2025-03-19 PROCEDURE — 3078F DIAST BP <80 MM HG: CPT | Performed by: NURSE PRACTITIONER

## 2025-03-19 PROCEDURE — 90750 HZV VACC RECOMBINANT IM: CPT | Performed by: NURSE PRACTITIONER

## 2025-03-19 PROCEDURE — 90472 IMMUNIZATION ADMIN EACH ADD: CPT | Performed by: NURSE PRACTITIONER

## 2025-03-19 PROCEDURE — 90471 IMMUNIZATION ADMIN: CPT | Performed by: NURSE PRACTITIONER

## 2025-03-19 PROCEDURE — 90715 TDAP VACCINE 7 YRS/> IM: CPT | Performed by: NURSE PRACTITIONER

## 2025-03-19 SDOH — HEALTH STABILITY: PHYSICAL HEALTH: ON AVERAGE, HOW MANY DAYS PER WEEK DO YOU ENGAGE IN MODERATE TO STRENUOUS EXERCISE (LIKE A BRISK WALK)?: 3 DAYS

## 2025-03-19 SDOH — HEALTH STABILITY: PHYSICAL HEALTH: ON AVERAGE, HOW MANY MINUTES DO YOU ENGAGE IN EXERCISE AT THIS LEVEL?: 30 MIN

## 2025-03-19 ASSESSMENT — SOCIAL DETERMINANTS OF HEALTH (SDOH): HOW OFTEN DO YOU GET TOGETHER WITH FRIENDS OR RELATIVES?: ONCE A WEEK

## 2025-03-19 NOTE — PROGRESS NOTES
Preventive Care Visit  [facility]  GISELA Cronin CNP, Family Medicine  Mar 19, 2025      Assessment & Plan     Routine general medical examination at a health care facility  We discussed healthy lifestyle, nutrition, cardiovascular risk reduction, self care, safety, sunscreen, and timing of cancer screening.  Health maintenance screening and immunizations reviewed with the patient.  Follow up yearly for the annual physical.    Colonoscopy up-to-date and next due 10 discussed decreasing caffeine as this may be affecting his sleep years after the last        - Comprehensive metabolic panel (BMP + Alb, Alk Phos, ALT, AST, Total. Bili, TP)  - Comprehensive metabolic panel (BMP + Alb, Alk Phos, ALT, AST, Total. Bili, TP)    Screening for prostate cancer  Routine screening performed shared decision making occurred with the ordering of the screening test  - PSA, screen  - PSA, screen    Screening for hyperlipidemia  Fasting today  - Lipid Profile (Chol, Trig, HDL, LDL calc)  - Lipid Profile (Chol, Trig, HDL, LDL calc)    Screening for diabetes mellitus  No known family history of diabetes  - Hemoglobin A1c  - Hemoglobin A1c    Multiple hypopigmented skin lesions on both forearms, lower shins, and upper chest  Idiopathic guttate hypomelanosis  Discussed differential diagnosis and my personal opinion is that this is IGH hypomelanosis that occurs with aging chronic sun exposure over time.  I discussed with patient that these appear such as similar to freckles and do not resolve.  I do not recommend putting on a cream but we can check these sizes over time.  He has the largest lesions on his chest x 2 but all the remaining ones are 1 to 2 mm macules -they are currently completely asymptomatic and are increasing in frequency with age consistent with IgH -reviewed ABCDE of melanoma and for patient to report any of these changes with over the next year otherwise will check sizes and quantity at  "follow-up appointment in 1 year    normal aging- Idiopathic guttate melanosis (IGH), Environmental factors (cumulative sun exposure and microtrauma), not likely to be B12, copper or iron deficiency however will check CBC today  Patient has been advised of split billing requirements and indicates understanding: Yes        BMI  Estimated body mass index is 31.24 kg/m  as calculated from the following:    Height as of this encounter: 1.803 m (5' 11\").    Weight as of this encounter: 101.6 kg (224 lb).       Counseling  Appropriate preventive services were addressed with this patient via screening, questionnaire, or discussion as appropriate for fall prevention, nutrition, physical activity, Tobacco-use cessation, social engagement, weight loss and cognition.  Checklist reviewing preventive services available has been given to the patient.  Reviewed patient's diet, addressing concerns and/or questions.   He is at risk for lack of exercise and has been provided with information to increase physical activity for the benefit of his well-being.           Saqib Hi is a 55 year old, presenting for the following:  Physical (Pt is fasting- white spots all over the body, not itchy, lump left jaw and right lower leg- not painful)        3/19/2025     4:11 PM   Additional Questions   Roomed by Gi VALENZUELA    Having small dots of white - on chest -started on the chest and abdomen and now on leg and arm a last two years - and more dark freckles as well -denies pain, itching, previous injuries, exposures to harsh chemicals, no known family history of this     Denies LUTS, no sx of BPH    Mom stroke in late 80s    Difficulty with falling back asleep occasionally at night after he wakes initially.  He does drink caffeine all throughout the day.  He did drink black tea before coming to today's appointment              Advance Care Planning  Patient does not have a Health Care Directive: Discussed advance care planning " with patient; information given to patient to review.      3/19/2025   General Health   How would you rate your overall physical health? Excellent   Feel stress (tense, anxious, or unable to sleep) Not at all         3/19/2025   Nutrition   Three or more servings of calcium each day? Yes   Diet: Regular (no restrictions)   How many servings of fruit and vegetables per day? 4 or more   How many sweetened beverages each day? 0-1         3/19/2025   Exercise   Days per week of moderate/strenous exercise 3 days   Average minutes spent exercising at this level 30 min         3/19/2025   Social Factors   Frequency of gathering with friends or relatives Once a week   Worry food won't last until get money to buy more No   Food not last or not have enough money for food? No   Do you have housing? (Housing is defined as stable permanent housing and does not include staying ouside in a car, in a tent, in an abandoned building, in an overnight shelter, or couch-surfing.) Yes   Are you worried about losing your housing? No   Lack of transportation? No   Unable to get utilities (heat,electricity)? No         3/19/2025   Fall Risk   Fallen 2 or more times in the past year? No   Trouble with walking or balance? No          3/19/2025   Dental   Dentist two times every year? Yes           Today's PHQ-2 Score:       3/19/2025     4:19 PM   PHQ-2 ( 1999 [employer])   Q1: Little interest or pleasure in doing things 0   Q2: Feeling down, depressed or hopeless 0   PHQ-2 Score 0    Q1: Little interest or pleasure in doing things Not at all   Q2: Feeling down, depressed or hopeless Not at all   PHQ-2 Score 0       Patient-reported           3/19/2025   Substance Use   Alcohol more than 3/day or more than 7/wk No   Do you use any other substances recreationally? No     Social History     Tobacco Use    Smoking status: Never     Passive exposure: Never    Smokeless tobacco: Never    Tobacco comments:     no vaping   Vaping Use    Vaping status:  "Never Used   Substance Use Topics    Alcohol use: Yes     Comment: Very rarely    Drug use: Never           3/19/2025   STI Screening   New sexual partner(s) since last STI/HIV test? No   Last PSA: No results found for: \"PSA\"  ASCVD Risk   The 10-year ASCVD risk score (Ksenia JAQUEZ, et al., 2019) is: 5.1%    Values used to calculate the score:      Age: 55 years      Sex: Male      Is Non- : Yes      Diabetic: No      Tobacco smoker: No      Systolic Blood Pressure: 112 mmHg      Is BP treated: No      HDL Cholesterol: 48 mg/dL      Total Cholesterol: 167 mg/dL           Reviewed and updated as needed this visit by Provider        Surg Hx  Fam Hx                  Review of Systems  Constitutional, HEENT, cardiovascular, pulmonary, gi and gu systems are negative, except as otherwise noted.     Objective    Exam  /70 (BP Location: Right arm, Patient Position: Sitting)   Pulse 61   Temp 97.6  F (36.4  C)   Resp 16   Ht 1.803 m (5' 11\")   Wt 101.6 kg (224 lb)   SpO2 99%   BMI 31.24 kg/m     Estimated body mass index is 31.24 kg/m  as calculated from the following:    Height as of this encounter: 1.803 m (5' 11\").    Weight as of this encounter: 101.6 kg (224 lb).    Physical Exam  GENERAL: alert and no distress  EYES: Eyes grossly normal to inspection, PERRL and conjunctivae and sclerae normal  HENT: ear canals and TM's normal, nose and mouth without ulcers or lesions  NECK: no adenopathy, no asymmetry, masses, or scars  RESP: lungs clear to auscultation - no rales, rhonchi or wheezes  CV: regular rate and rhythm, normal S1 S2, no S3 or S4, no murmur, click or rub, no peripheral edema  ABDOMEN: soft, nontender, no hepatosplenomegaly, no masses and bowel sounds normal  MS: no gross musculoskeletal defects noted, no edema  SKIN: scattered flat hypopigmented macuols on chest, forearms, and shins, largest is 6x7mm and 4mm, the remaining only 1-2mm  NEURO: Normal strength and " tone, mentation intact and speech normal  PSYCH: mentation appears normal, affect normal/bright        Signed Electronically by: GISELA Cronin CNP

## 2025-03-19 NOTE — PATIENT INSTRUCTIONS
SUN PROTECTION TIPS  At one time, a lot of sun exposure was thought to be healthy. But today, with the thinning of the ozone layer and increased ultraviolet light, exposure to the sun can be dangerous without proper protection. When UV rays come into contact with skin, they damage skin cells, causing freckles, wrinkles, sunburn and even skin cancer, the most commonly diagnosed cancer in the [place]. UVA rays, a type of UV light, penetrate the skin deeply and are primarily responsible for premature aging/wrinkling, while UVB rays are responsible for sunburn and skin cancer. Sunburns received during childhood greatly increase the risk of developing melanoma, the most serious kind of skin cancer, according to the American Melanoma Foundation.    How to Protect Your Skin  Cover Up:  Wear protective clothing and hats when out in the sun. Choose light colors, which do not absorb heat as much as dark colors, and choose loose-fitting clothing made from tightly woven fabric. UV protectant clothing is now available at most outdoor stores and on-line.    Three-Step Protection:  1. Apply at least one ounce or an espresso-shot worth of sunscreen 30 minutes before activity so your skin has time to absorb it.  2. Make sure it gets under the edges of your clothing.  3. Reapply every 2 hours; sooner if you are sweating profusely or wiping it off.    Seek Shade:  When the sun is nearly directly overhead, especially between 10:00 am and 3:00 pm, try to limit sun exposure.   Protect Your Eyes:  Wrap around sunglasses that block 100% of UVA and UVB light are best.  Protect Your Lips:  Lips can never tan, but they easily burn. Use protective lip balm every day and reapply it often.  Ban the Tan:  There is no such thing as a safe tan! Do not use tanning beds. If one wishes the brown look of a tan, use bronzers or sunless tanning lotion.   Vitamin D:  Vitamin D is obtained through exposure to the sun. If you carefully limit sun  exposure, you may need to supplement with Vitamin D. Adults can take 1,000-2,000 units once daily.    Watch out:  If you notice any freckles, moles, or other skin spots that grow larger than a pencil eraser or change shape or color, see a provider for evaluation. Potentially dangerous skin problems can usually be treated successfully if caught early enough. The following ABCDE's of melanoma can help you look for concerning features when checking your moles:  A-Asymmetry (spots will not look the same on either side)   B-Borders (irregular borders)   C-Color (any change in color, including darkening and lightening of the mole)   D- Diameter (anything larger than a pencil eraser)   E-Evolution (any mole changing, growing, bleeding)    Watch your fingernails and toenails:  If you notice any coloring(red, pink, black or brown) in your nail that is new, you will want to have it checked to make sure it is not a skin cancer. This coloring can be in the form of a dot, streak, etc.    Use Sun Blocks:  Make sure you use SPF 30 or higher sunscreen whenever outside. It takes as little as 15 minutes of sun exposure to harm the skin, so keep a bottle of sunscreen by the door to remind yourself to reapply whenever going out. Use water resistant sunscreen if at the pool or beach. Wear sunscreen even on overcast days. The safest sunscreens contain Zinc Oxide and/or Titanium Dioxide.     Examples of Sunscreens and Clothing Vendors    Physical Sunscreens  SkinCeuticals Physical Defense  Neutrogena Sensitive Skin Sunscreen Lotion  Neutrogena Pure and Free  [place] Sunscreen  Solbar  Eucerin Sensitive Skin Everyday Protection Face Lotion  Aveeno Natural Protection Mineral Block Lotion  Vanicream SPF 30 and 60    Paba-Free Sunscreens  Blue Lizard  Vanicream  Coppertone Oil-Free Sunscreen Lotion  Neutrogena Ultra-Sheer Dry Touch Sunscreen   Neutrogena Healthy Defense  Cetaphil Daily Facial Moisturizer    Sun Protective  Clothing  www.coolibar.com  www.sunprecautions.com  www.Gigalocal.com  ADDY    ABCDEs of Melanoma    A = asymmetry    B = border    C = color    D = diameter    E = evolving             Patient Education   Preventive Care Advice   This is general advice given by our system to help you stay healthy. However, your care team may have specific advice just for you. Please talk to your care team about your preventive care needs.  Nutrition  Eat 5 or more servings of fruits and vegetables each day.  Try wheat bread, brown rice and whole grain pasta (instead of white bread, rice, and pasta).  Get enough calcium and vitamin D. Check the label on foods and aim for 100% of the RDA (recommended daily allowance).  Lifestyle  Exercise at least 150 minutes each week  (30 minutes a day, 5 days a week).  Do muscle strengthening activities 2 days a week. These help control your weight and prevent disease.  No smoking.  Wear sunscreen to prevent skin cancer.  Have a dental exam and cleaning every 6 months.  Yearly exams  See your health care team every year to talk about:  Any changes in your health.  Any medicines your care team has prescribed.  Preventive care, family planning, and ways to prevent chronic diseases.  Shots (vaccines)   HPV shots (up to age 26), if you've never had them before.  Hepatitis B shots (up to age 59), if you've never had them before.  COVID-19 shot: Get this shot when it's due.  Flu shot: Get a flu shot every year.  Tetanus shot: Get a tetanus shot every 10 years.  Pneumococcal, hepatitis A, and RSV shots: Ask your care team if you need these based on your risk.  Shingles shot (for age 50 and up)  General health tests  Diabetes screening:  Starting at age 35, Get screened for diabetes at least every 3 years.  If you are younger than age 35, ask your care team if you should be screened for diabetes.  Cholesterol test: At age 39, start having a cholesterol test every 5 years, or more often if advised.  Bone  density scan (DEXA): At age 50, ask your care team if you should have this scan for osteoporosis (brittle bones).  Hepatitis C: Get tested at least once in your life.  STIs (sexually transmitted infections)  Before age 24: Ask your care team if you should be screened for STIs.  After age 24: Get screened for STIs if you're at risk. You are at risk for STIs (including HIV) if:  You are sexually active with more than one person.  You don't use condoms every time.  You or a partner was diagnosed with a sexually transmitted infection.  If you are at risk for HIV, ask about PrEP medicine to prevent HIV.  Get tested for HIV at least once in your life, whether you are at risk for HIV or not.  Cancer screening tests  Cervical cancer screening: If you have a cervix, begin getting regular cervical cancer screening tests starting at age 21.  Breast cancer scan (mammogram): If you've ever had breasts, begin having regular mammograms starting at age 40. This is a scan to check for breast cancer.  Colon cancer screening: It is important to start screening for colon cancer at age 45.  Have a colonoscopy test every 10 years (or more often if you're at risk) Or, ask your provider about stool tests like a FIT test every year or Cologuard test every 3 years.  To learn more about your testing options, visit:   .  For help making a decision, visit:   https://bit.ly/an35207.  Prostate cancer screening test: If you have a prostate, ask your care team if a prostate cancer screening test (PSA) at age 55 is right for you.  Lung cancer screening: If you are a current or former smoker ages 50 to 80, ask your care team if ongoing lung cancer screenings are right for you.  For informational purposes only. Not to replace the advice of your health care provider. Copyright   2023 [employer]. All rights reserved. Clinically reviewed by the [facility]. [facility] ([facility]) [id number] - REV 01/24.

## 2025-03-19 NOTE — RESULT ENCOUNTER NOTE
Your screen for diabetes does show some impaired glucose tolerance that we call prediabetes. We determine this is the case when your hemoglobin A1c (average measure of your glucose levels from the past 3 months) is between 5.7% and 6.4%. Your hemoglobin A1c value was 5.7%.  I suggest having this rechecked in another 3 months. To prevent progression to diabetes, you can continue to increase physical activity to 150min per week or more, increase plant forward foods such as fresh fruits/veggies, as well as limit sugar, carbs and processed foods.  Please schedule a follow-up in 3 months at your earliest convenience.  If your hemoglobin A1c continues to rise and approaches closer to 6.5% we can discuss starting on a medication to prevent diabetes.

## 2025-03-20 ENCOUNTER — TELEPHONE (OUTPATIENT)
Dept: FAMILY MEDICINE | Facility: CLINIC | Age: 55
End: 2025-03-20
Payer: COMMERCIAL

## 2025-03-20 LAB
ALBUMIN SERPL BCG-MCNC: 4.7 G/DL (ref 3.5–5.2)
ALP SERPL-CCNC: 78 U/L (ref 40–150)
ALT SERPL W P-5'-P-CCNC: 24 U/L (ref 0–70)
ANION GAP SERPL CALCULATED.3IONS-SCNC: 9 MMOL/L (ref 7–15)
AST SERPL W P-5'-P-CCNC: 21 U/L (ref 0–45)
BILIRUB SERPL-MCNC: 0.9 MG/DL
BUN SERPL-MCNC: 16.1 MG/DL (ref 6–20)
CALCIUM SERPL-MCNC: 9.5 MG/DL (ref 8.8–10.4)
CHLORIDE SERPL-SCNC: 102 MMOL/L (ref 98–107)
CHOLEST SERPL-MCNC: 164 MG/DL
CREAT SERPL-MCNC: 1.01 MG/DL (ref 0.67–1.17)
EGFRCR SERPLBLD CKD-EPI 2021: 88 ML/MIN/1.73M2
ERYTHROCYTE [DISTWIDTH] IN BLOOD BY AUTOMATED COUNT: 12.3 % (ref 10–15)
FASTING STATUS PATIENT QL REPORTED: YES
FASTING STATUS PATIENT QL REPORTED: YES
GLUCOSE SERPL-MCNC: 84 MG/DL (ref 70–99)
HCO3 SERPL-SCNC: 26 MMOL/L (ref 22–29)
HCT VFR BLD AUTO: 42.2 % (ref 40–53)
HDLC SERPL-MCNC: 55 MG/DL
HGB BLD-MCNC: 14.9 G/DL (ref 13.3–17.7)
LDLC SERPL CALC-MCNC: 97 MG/DL
MCH RBC QN AUTO: 30 PG (ref 26.5–33)
MCHC RBC AUTO-ENTMCNC: 35.3 G/DL (ref 31.5–36.5)
MCV RBC AUTO: 85 FL (ref 78–100)
NONHDLC SERPL-MCNC: 109 MG/DL
PLATELET # BLD AUTO: 203 10E3/UL (ref 150–450)
POTASSIUM SERPL-SCNC: 4.5 MMOL/L (ref 3.4–5.3)
PROT SERPL-MCNC: 7.5 G/DL (ref 6.4–8.3)
PSA SERPL DL<=0.01 NG/ML-MCNC: 0.78 NG/ML (ref 0–3.5)
RBC # BLD AUTO: 4.97 10E6/UL (ref 4.4–5.9)
SODIUM SERPL-SCNC: 137 MMOL/L (ref 135–145)
TRIGL SERPL-MCNC: 61 MG/DL
WBC # BLD AUTO: 5.9 10E3/UL (ref 4–11)

## 2025-03-20 NOTE — TELEPHONE ENCOUNTER
Outgoing call to patient. Relayed PCP's detailed message. Patient would also like Turpitudehart message regarding provider message. No further questions/concerns at this time.      ----- Message from Delisa Perry sent at 3/19/2025  6:18 PM CDT -----  Your screen for diabetes does show some impaired glucose tolerance that we call prediabetes. We determine this is the case when your hemoglobin A1c (average measure of your glucose levels from the past 3 months) is between 5.7% and 6.4%. Your hemoglobin A1c value was 5.7%.  I suggest having this rechecked in another 3 months. To prevent progression to diabetes, you can continue to increase physical activity to 150min per week or more, increase plant forward foods such as fresh fruits/veggies, as well as limit sugar, carbs and processed foods.  Please schedule a follow-up in 3 months at your earliest convenience.  If your hemoglobin A1c continues to rise and approaches closer to 6.5% we can discuss starting on a medication to prevent diabetes.

## 2025-03-24 ENCOUNTER — OFFICE VISIT (OUTPATIENT)
Dept: URGENT CARE | Facility: URGENT CARE | Age: 55
End: 2025-03-24
Payer: COMMERCIAL

## 2025-03-24 VITALS
HEART RATE: 64 BPM | BODY MASS INDEX: 31.24 KG/M2 | WEIGHT: 224 LBS | SYSTOLIC BLOOD PRESSURE: 113 MMHG | TEMPERATURE: 98.4 F | OXYGEN SATURATION: 98 % | RESPIRATION RATE: 24 BRPM | DIASTOLIC BLOOD PRESSURE: 73 MMHG

## 2025-03-24 DIAGNOSIS — M25.561 ACUTE PAIN OF RIGHT KNEE: Primary | ICD-10-CM

## 2025-03-24 PROCEDURE — 3074F SYST BP LT 130 MM HG: CPT | Performed by: PHYSICIAN ASSISTANT

## 2025-03-24 PROCEDURE — 99213 OFFICE O/P EST LOW 20 MIN: CPT | Performed by: PHYSICIAN ASSISTANT

## 2025-03-24 PROCEDURE — 3078F DIAST BP <80 MM HG: CPT | Performed by: PHYSICIAN ASSISTANT

## 2025-03-24 NOTE — PROGRESS NOTES
[facility] [place] [address] [facility] [address]  Phone: [phone]  Fax: [phone]    Patient:  Kolton Oliva, Date of birth 1970  Date of Visit:  03/24/2025  Referring Provider No ref. provider found    Patient presents with:  Knee Pain: Right sided knee pain that started 3 days ago. Unsure if it was injured, doesn't recall anything specific.         ICD-10-CM    1. Acute pain of right knee  M25.561           Patient Instructions   Based on our discussion, I have outlined the following instructions for you:    - Rest from activities that use the front thigh muscle to avoid straining your knee.  - Avoid kneeling to reduce pressure on your knee.  - Use an ace bandage or knee sleeve to support your knee.  - Apply ice to your knee to help reduce swelling.  - Continue taking Tylenol and ibuprofen to manage pain.  - Limit walking to 20 minutes at a time to prevent worsening symptoms.  - Schedule a follow-up if symptoms continue after a week for further evaluation.    Next appointment(s): - Follow-up appointment if symptoms persist after one week.    Thank you again for your visit, and we look forward to supporting you in your journey to better health.    Assessment & Plan      Assessment  - Possible bursitis of the subcutaneous infrapatellar bursa due to recent kneeling activity.  - Potential inflammation of the ligament connecting the patella to the shinbone, possibly related to recent leg exercises.    Plan  - Rest from activities that work the front quad muscle to prevent further strain on the ligament.  - Avoid kneeling to reduce pressure on the bursa.  - Use compression with an ace bandage or knee sleeve to support the knee.  - Apply ice to the knee to reduce inflammation.  - Continue with Tylenol and ibuprofen for pain management.  - Limit walking to 20 minutes at a time to prevent exacerbation of symptoms.  - Follow-up if symptoms persist after a week for further  evaluation.    Prescription  - Continue with Tylenol and ibuprofen for pain management.    Appointments  - Follow-up appointment if symptoms persist after one week.         History of Present Illness     Pertinent history obtain from: patient    Kolton Oliva, a 55-year-old male, began experiencing right knee pain approximately three days ago. The pain intensified two days ago, particularly at night, prompting him to take ibuprofen for relief. He reports difficulty putting pressure on the knee and describes the pain as being primarily located in the front, beneath the patella. Kolton mentions engaging in activities that involved kneeling, which coincided with the onset of his symptoms. He also recalls performing exercises that involved lifting his leg, which may have contributed to the pain. He denies any recent sports activities or significant weightlifting.    Problem List:  2023-05: Hepatitis B antibody positive in blood  2023-05: Gastroesophageal reflux disease without esophagitis  2023-05: Tinnitus, left      No past medical history on file.    Social History     Tobacco Use    Smoking status: Never     Passive exposure: Never    Smokeless tobacco: Never    Tobacco comments:     no vaping   Substance Use Topics    Alcohol use: Yes     Comment: Very rarely       Physical Exam     Physical Exam  Vitals and nursing note reviewed.   Constitutional:       General: He is not in acute distress.     Appearance: He is not toxic-appearing or diaphoretic.   HENT:      Head: Normocephalic and atraumatic.   Eyes:      Conjunctiva/sclera: Conjunctivae normal.   Pulmonary:      Effort: Pulmonary effort is normal.   Musculoskeletal:      Right knee: No swelling, deformity, effusion, erythema, ecchymosis, lacerations, bony tenderness or crepitus. Normal range of motion. Tenderness present over the medial joint line, lateral joint line and patellar tendon. No LCL laxity, MCL laxity, ACL laxity or PCL laxity. Normal alignment, normal  meniscus and normal patellar mobility.   Neurological:      Mental Status: He is alert.   Psychiatric:         Mood and Affect: Mood normal.         Behavior: Behavior normal.         Thought Content: Thought content normal.         Judgment: Judgment normal.         Vital signs:  /73   Pulse 64   Temp 98.4  F (36.9  C) (Oral)   Resp 24   Wt 101.6 kg (224 lb)   SpO2 98%   BMI 31.24 kg/m                   Consent was obtained from the patient to use an AI documentation tool in the creation of  this note

## 2025-03-24 NOTE — LETTER
2025      Kolton Oliva  [address] [facility] [address] [facility] [address]        To Whom It May Concern:    Kolton Oliva was seen in our clinic. He may return to work with the following: limited to light duty - walking limited to 20 min at a time. Restriction  on 2025      Sincerely,      Brooke Forbes      Electronically signed

## 2025-03-24 NOTE — PATIENT INSTRUCTIONS
Based on our discussion, I have outlined the following instructions for you:    - Rest from activities that use the front thigh muscle to avoid straining your knee.  - Avoid kneeling to reduce pressure on your knee.  - Use an ace bandage or knee sleeve to support your knee.  - Apply ice to your knee to help reduce swelling.  - Continue taking Tylenol and ibuprofen to manage pain.  - Limit walking to 20 minutes at a time to prevent worsening symptoms.  - Schedule a follow-up if symptoms continue after a week for further evaluation.    Next appointment(s): - Follow-up appointment if symptoms persist after one week.    Thank you again for your visit, and we look forward to supporting you in your journey to better health.

## 2025-06-20 ENCOUNTER — RESULTS FOLLOW-UP (OUTPATIENT)
Dept: FAMILY MEDICINE | Facility: CLINIC | Age: 55
End: 2025-06-20

## 2025-06-20 PROBLEM — M54.50 ACUTE LEFT-SIDED LOW BACK PAIN WITHOUT SCIATICA: Status: ACTIVE | Noted: 2025-06-20

## 2025-06-20 PROBLEM — R73.03 PREDIABETES: Status: ACTIVE | Noted: 2025-06-20
